# Patient Record
Sex: FEMALE | Race: WHITE | HISPANIC OR LATINO | Employment: STUDENT | URBAN - METROPOLITAN AREA
[De-identification: names, ages, dates, MRNs, and addresses within clinical notes are randomized per-mention and may not be internally consistent; named-entity substitution may affect disease eponyms.]

---
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
## 2022-03-09 ENCOUNTER — TELEMEDICINE (OUTPATIENT)
Dept: FAMILY MEDICINE CLINIC | Facility: CLINIC | Age: 22
End: 2022-03-09

## 2022-03-09 DIAGNOSIS — B34.9 VIRAL INFECTION: Primary | ICD-10-CM

## 2022-03-09 DIAGNOSIS — K13.79 MOUTH SORES: ICD-10-CM

## 2022-03-09 PROCEDURE — 99213 OFFICE O/P EST LOW 20 MIN: CPT | Performed by: FAMILY MEDICINE

## 2022-03-09 NOTE — PROGRESS NOTES
COVID-19 Outpatient Progress Note    Assessment/Plan:    Problem List Items Addressed This Visit        Other    Mouth sores     Developed after multiple episodes of vomiting, pain mostly on the roof of mouth  No difficulty swallowing  Will order magic mouthwash, advised to avoid acidic foods  Recommend in office visit to confirm no other cause           Relevant Medications    al mag oxide-diphenhydramine-lidocaine viscous (MAGIC MOUTHWASH) 1:1:1 suspension    Viral infection - Primary     Symptom onset 3/4  As she is 5 days from symptom onset and otherwise resolving, would recommend in person visit to evaluate mouth ulcers  Continue to advise supportive care as needed              Disposition:     I have spent 10 minutes directly with the patient  Encounter provider Yris Joyner MD    Provider located at [address] [place] [id number] [phone]    Recent Visits  No visits were found meeting these conditions  Showing recent visits within past 7 days and meeting all other requirements  Today's Visits  Date Type Provider Dept   03/09/22 Telemedicine Silas Marques 11 today's visits and meeting all other requirements  Future Appointments  No visits were found meeting these conditions  Showing future appointments within next 150 days and meeting all other requirements     This virtual check-in was done via telephone and she agrees to proceed  Patient agrees to participate in a virtual check in via telephone or video visit instead of presenting to the office to address urgent/immediate medical needs  Patient is aware this is a billable service  After connecting through Telephone, the patient was identified by name and date of birth  Emerick Cogan was informed that this was a telemedicine visit and that the exam was being conducted confidentially over secure lines  My office door was closed   No one else was in the room  El Record acknowledged consent and understanding of privacy and security of the telemedicine visit  I informed the patient that I have reviewed her record in Epic and presented the opportunity for her to ask any questions regarding the visit today  The patient agreed to participate  It was my intent to perform this visit via video technology but the patient was not able to do a video connection so the visit was completed via audio telephone only  Verification of patient location:  Patient is located in the following state in which I hold an active license: [place]    Subjective: El Record is a 25 y o  female who is concerned about COVID-19  Patient's symptoms include fatigue, sore throat, cough and vomiting  Patient denies fever, chills, shortness of breath, nausea and headaches  - Date of symptom onset: 3/4/2022      COVID-19 vaccination status: Partially vaccinated    No results found for: Chantel Rousseau, [address] [facility], [address] [facility], [place] [address], [address] [facility], [address] [facility]  Past Medical History:   Diagnosis Date    ADD (attention deficit disorder)     Anxiety 1/28/2020    Depression 1/28/2020    Gastroenteritis 12/26/2019     Past Surgical History:   Procedure Laterality Date    NO PAST SURGERIES       Current Outpatient Medications   Medication Sig Dispense Refill    al mag oxide-diphenhydramine-lidocaine viscous (MAGIC MOUTHWASH) 1:1:1 suspension Swish and spit 10 mL every 4 (four) hours as needed for mouth pain or discomfort 90 mL 0    Multiple Vitamins-Minerals (MULTIVITAMIN WITH MINERALS) tablet Take 1 tablet by mouth daily 90 tablet 3     No current facility-administered medications for this visit  No Known Allergies    Review of Systems   Constitutional: Positive for fatigue  Negative for chills and fever  HENT: Positive for sore throat  Respiratory: Positive for cough  Negative for shortness of breath  Gastrointestinal: Positive for vomiting   Negative for nausea  Neurological: Negative for headaches  VIRTUAL VISIT DISCLAIMER    Kristy Suarez verbally agrees to participate in [employer]  Pt is aware that [employer] could be limited without vital signs or the ability to perform a full hands-on physical [place] understands she or the provider may request at any time to terminate the video visit and request the patient to seek care or treatment in person

## 2022-03-09 NOTE — ASSESSMENT & PLAN NOTE
Developed after multiple episodes of vomiting, pain mostly on the roof of mouth  No difficulty swallowing  Will order magic mouthwash, advised to avoid acidic foods  Recommend in office visit to confirm no other cause

## 2022-03-09 NOTE — ASSESSMENT & PLAN NOTE
Symptom onset 3/4  As she is 5 days from symptom onset and otherwise resolving, would recommend in person visit to evaluate mouth ulcers  Continue to advise supportive care as needed

## 2022-10-12 PROBLEM — Z00.00 WELL ADULT HEALTH CHECK: Status: RESOLVED | Noted: 2021-04-27 | Resolved: 2022-10-12

## 2022-10-12 PROBLEM — Z11.8 SCREENING FOR CHLAMYDIAL DISEASE: Status: RESOLVED | Noted: 2021-04-27 | Resolved: 2022-10-12

## 2024-08-28 ENCOUNTER — TELEPHONE (OUTPATIENT)
Age: 24
End: 2024-08-28

## 2024-08-28 NOTE — TELEPHONE ENCOUNTER
Patients mom called with language line for [place] and I scheduled patient on 10/3.  I tried to call the office to see if more time needs to be added. Also I tried to enter the [employer] and our system is not going through . We been having issues today.  The id is [id number].  Thank you

## 2024-09-06 ENCOUNTER — HOSPITAL ENCOUNTER (EMERGENCY)
Facility: HOSPITAL | Age: 24
Discharge: HOME | End: 2024-09-07
Attending: EMERGENCY MEDICINE
Payer: COMMERCIAL

## 2024-09-06 VITALS
SYSTOLIC BLOOD PRESSURE: 173 MMHG | OXYGEN SATURATION: 97 % | DIASTOLIC BLOOD PRESSURE: 99 MMHG | RESPIRATION RATE: 18 BRPM | TEMPERATURE: 97.6 F | HEART RATE: 80 BPM

## 2024-09-06 DIAGNOSIS — R10.9 ABDOMINAL PAIN: Primary | ICD-10-CM

## 2024-09-06 DIAGNOSIS — N39.0 UTI (URINARY TRACT INFECTION): ICD-10-CM

## 2024-09-06 DIAGNOSIS — G43.909 MIGRAINE: ICD-10-CM

## 2024-09-06 DIAGNOSIS — R11.10 VOMITING: ICD-10-CM

## 2024-09-06 LAB
EXT PREGNANCY TEST URINE: NEGATIVE
EXT. CONTROL: NORMAL

## 2024-09-06 PROCEDURE — 81001 URINALYSIS AUTO W/SCOPE: CPT

## 2024-09-06 PROCEDURE — 99284 EMERGENCY DEPT VISIT MOD MDM: CPT | Performed by: EMERGENCY MEDICINE

## 2024-09-06 PROCEDURE — 96375 TX/PRO/DX INJ NEW DRUG ADDON: CPT

## 2024-09-06 PROCEDURE — 87591 N.GONORRHOEAE DNA AMP PROB: CPT

## 2024-09-06 PROCEDURE — 99284 EMERGENCY DEPT VISIT MOD MDM: CPT

## 2024-09-06 PROCEDURE — 96361 HYDRATE IV INFUSION ADD-ON: CPT

## 2024-09-06 PROCEDURE — 81025 URINE PREGNANCY TEST: CPT

## 2024-09-06 PROCEDURE — 96365 THER/PROPH/DIAG IV INF INIT: CPT

## 2024-09-06 PROCEDURE — 87491 CHLMYD TRACH DNA AMP PROBE: CPT

## 2024-09-06 RX ORDER — KETOROLAC TROMETHAMINE 30 MG/ML
15 INJECTION, SOLUTION INTRAMUSCULAR; INTRAVENOUS ONCE
Status: DISCONTINUED | OUTPATIENT
Start: 2024-09-06 | End: 2024-09-07 | Stop reason: HOSPADM

## 2024-09-06 RX ORDER — MAGNESIUM SULFATE HEPTAHYDRATE 40 MG/ML
2 INJECTION, SOLUTION INTRAVENOUS ONCE
Status: COMPLETED | OUTPATIENT
Start: 2024-09-06 | End: 2024-09-06

## 2024-09-06 RX ORDER — DIPHENHYDRAMINE HYDROCHLORIDE 50 MG/ML
25 INJECTION INTRAMUSCULAR; INTRAVENOUS ONCE
Status: COMPLETED | OUTPATIENT
Start: 2024-09-06 | End: 2024-09-06

## 2024-09-06 RX ORDER — METOCLOPRAMIDE HYDROCHLORIDE 5 MG/ML
10 INJECTION INTRAMUSCULAR; INTRAVENOUS ONCE
Status: COMPLETED | OUTPATIENT
Start: 2024-09-06 | End: 2024-09-06

## 2024-09-06 RX ADMIN — SODIUM CHLORIDE 1000 ML: 0.9 INJECTION, SOLUTION INTRAVENOUS at 21:14

## 2024-09-06 RX ADMIN — METOCLOPRAMIDE 10 MG: 5 INJECTION, SOLUTION INTRAMUSCULAR; INTRAVENOUS at 21:12

## 2024-09-06 RX ADMIN — MAGNESIUM SULFATE HEPTAHYDRATE 2 G: 40 INJECTION, SOLUTION INTRAVENOUS at 21:13

## 2024-09-06 RX ADMIN — DIPHENHYDRAMINE HYDROCHLORIDE 25 MG: 50 INJECTION, SOLUTION INTRAMUSCULAR; INTRAVENOUS at 21:13

## 2024-09-07 LAB
BACTERIA UR QL AUTO: ABNORMAL /HPF
BILIRUB UR QL STRIP: NEGATIVE
CLARITY UR: CLEAR
COLOR UR: YELLOW
GLUCOSE UR STRIP-MCNC: NEGATIVE MG/DL
HGB UR QL STRIP.AUTO: ABNORMAL
KETONES UR STRIP-MCNC: ABNORMAL MG/DL
LEUKOCYTE ESTERASE UR QL STRIP: NEGATIVE
MUCOUS THREADS UR QL AUTO: ABNORMAL
NITRITE UR QL STRIP: POSITIVE
NON-SQ EPI CELLS URNS QL MICRO: ABNORMAL /HPF
PH UR STRIP.AUTO: 6.5 [PH]
PROT UR STRIP-MCNC: ABNORMAL MG/DL
RBC #/AREA URNS AUTO: ABNORMAL /HPF
SP GR UR STRIP.AUTO: 1.02 (ref 1–1.03)
UROBILINOGEN UR STRIP-ACNC: <2 MG/DL
WBC #/AREA URNS AUTO: ABNORMAL /HPF

## 2024-09-07 RX ORDER — CEPHALEXIN 500 MG/1
500 CAPSULE ORAL ONCE
Status: COMPLETED | OUTPATIENT
Start: 2024-09-07 | End: 2024-09-07

## 2024-09-07 RX ORDER — CEPHALEXIN 500 MG/1
500 CAPSULE ORAL EVERY 6 HOURS SCHEDULED
Qty: 28 CAPSULE | Refills: 0 | Status: SHIPPED | OUTPATIENT
Start: 2024-09-07 | End: 2024-09-14

## 2024-09-07 RX ADMIN — CEPHALEXIN 500 MG: 500 CAPSULE ORAL at 00:29

## 2024-09-07 NOTE — ED PROVIDER NOTES
History  Chief Complaint   Patient presents with    Personal Problem     Pt reports getting her period today and states her pain is different the last couple months and reports SOB earlier but has anxiety and the SOB has since resolved but she's still cramping and in pain mostly on the left lower side.     HPI    Patient is a 23-year-old female  with no significant past medical history presenting for left lower quadrant abdominal pain, vomiting, and migraine that began earlier today after the start of her menstrual cycle.  Patient states that generally has pain with menstruation but not to this degree.  She has not been tolerating p.o. and has been vomiting all day today.  Patient states that her menstrual cycle is light and she is not saturating pads.  Patient also states she had an episode of diarrhea earlier today.  Patient denies fevers, chills, constipation, chest pain, shortness of breath, abnormal discharge, or urinary symptoms.    Prior to Admission Medications   Prescriptions Last Dose Informant Patient Reported? Taking?   Multiple Vitamins-Minerals (MULTIVITAMIN WITH MINERALS) tablet  Self No No   Sig: Take 1 tablet by mouth daily   al mag oxide-diphenhydramine-lidocaine viscous (MAGIC MOUTHWASH) 1:1:1 suspension   No No   Sig: Swish and spit 10 mL every 4 (four) hours as needed for mouth pain or discomfort      Facility-Administered Medications: None       Past Medical History:   Diagnosis Date    ADD (attention deficit disorder)     Anxiety 2020    Depression 2020    Gastroenteritis 2019       Past Surgical History:   Procedure Laterality Date    NO PAST SURGERIES         Family History   Problem Relation Age of Onset    No Known Problems Mother     Asthma Brother     Other Maternal Grandfather         Cardiac disorder    Coronary artery disease Maternal Grandfather     Diabetes Family     Diabetes Maternal Aunt      I have reviewed and agree with the history as  documented.    E-Cigarette/Vaping    E-Cigarette Use Never User      E-Cigarette/Vaping Substances    Nicotine No     THC No     CBD No     Flavoring No     Other No     Unknown No      Social History     Tobacco Use    Smoking status: Never    Smokeless tobacco: Never   Vaping Use    Vaping status: Never Used   Substance Use Topics    Alcohol use: Never    Drug use: Never        Review of Systems   Gastrointestinal:  Positive for abdominal pain, diarrhea and vomiting.   Neurological:  Positive for headaches.   All other systems reviewed and are negative.      Physical Exam  ED Triage Vitals [09/06/24 1912]   Temperature Pulse Respirations Blood Pressure SpO2   97.6 °F (36.4 °C) 86 16 (!) 173/99 97 %      Temp Source Heart Rate Source Patient Position - Orthostatic VS BP Location FiO2 (%)   Temporal Monitor Sitting Left arm --      Pain Score       6             Orthostatic Vital Signs  Vitals:    09/06/24 1912 09/06/24 2300   BP: (!) 173/99    Pulse: 86 80   Patient Position - Orthostatic VS: Sitting        Physical Exam  Vitals and nursing note reviewed.   Constitutional:       General: She is not in acute distress.     Appearance: Normal appearance. She is not ill-appearing, toxic-appearing or diaphoretic.   HENT:      Head: Normocephalic and atraumatic.   Eyes:      General: No scleral icterus.        Right eye: No discharge.         Left eye: No discharge.   Cardiovascular:      Rate and Rhythm: Normal rate and regular rhythm.      Pulses: Normal pulses.      Heart sounds: Normal heart sounds. No murmur heard.  Pulmonary:      Effort: Pulmonary effort is normal. No respiratory distress.      Breath sounds: Normal breath sounds. No stridor. No wheezing, rhonchi or rales.   Abdominal:      General: Bowel sounds are normal. There is no distension.      Palpations: Abdomen is soft.      Tenderness: There is generalized abdominal tenderness. There is no right CVA tenderness, left CVA tenderness or guarding. Negative  signs include Ordoñez's sign and McBurney's sign.   Musculoskeletal:         General: Normal range of motion.      Cervical back: Normal range of motion and neck supple. No tenderness.      Right lower leg: No edema.      Left lower leg: No edema.   Skin:     General: Skin is warm and dry.      Coloration: Skin is not jaundiced.      Findings: No erythema.   Neurological:      General: No focal deficit present.      Mental Status: She is alert and oriented to person, place, and time.      Sensory: No sensory deficit.      Motor: No weakness.   Psychiatric:         Mood and Affect: Mood normal.         Behavior: Behavior normal.         Thought Content: Thought content normal.         Judgment: Judgment normal.         ED Medications  Medications   metoclopramide (REGLAN) injection 10 mg (10 mg Intravenous Given 9/6/24 2112)   diphenhydrAMINE (BENADRYL) injection 25 mg (25 mg Intravenous Given 9/6/24 2113)   magnesium sulfate 2 g/50 mL IVPB (premix) 2 g (0 g Intravenous Stopped 9/6/24 2154)   sodium chloride 0.9 % bolus 1,000 mL (0 mL Intravenous Stopped 9/6/24 2236)   cephalexin (KEFLEX) capsule 500 mg (500 mg Oral Given 9/7/24 0029)       Diagnostic Studies  Results Reviewed       Procedure Component Value Units Date/Time    Urine Microscopic [[id number]]  (Abnormal) Collected: 09/06/24 2227    Lab Status: Final result Specimen: Urine, Clean Catch Updated: 09/07/24 0021     RBC, UA Innumerable /hpf      WBC, UA 2-4 /hpf      Epithelial Cells Occasional /hpf      Bacteria, UA None Seen /hpf      MUCUS THREADS Occasional    UA w Reflex to Microscopic w Reflex to Culture [[id number]]  (Abnormal) Collected: 09/06/24 2227    Lab Status: Final result Specimen: Urine, Clean Catch Updated: 09/07/24 0020     Color, UA Yellow     Clarity, UA Clear     Specific Gravity, UA 1.025     pH, UA 6.5     Leukocytes, UA Negative     Nitrite, UA Positive     Protein, UA 30 (1+) mg/dl      Glucose, UA Negative mg/dl      Ketones, UA >=150  (4+) mg/dl      Urobilinogen, UA <2.0 mg/dl      Bilirubin, UA Negative     Occult Blood, UA Moderate    Chlamydia/GC amplified DNA by PCR [[id number]] Collected: 09/06/24 2227    Lab Status: In process Specimen: Urine, Other Updated: 09/06/24 2237    POCT pregnancy, urine [[id number]]  (Normal) Resulted: 09/06/24 2232    Lab Status: Final result Updated: 09/06/24 2232     EXT Preg Test, Ur Negative     Control Valid                   No orders to display         Procedures  Procedures      ED Course  ED Course as of 09/07/24 1327   Fri Sep 06, 2024   2202 Patient reassessed and states she is feeling better.  Patient provided with a urine cup to provide sample.   2232 PREGNANCY TEST URINE: Negative                             SBIRT 22yo+      Flowsheet Row Most Recent Value   Initial Alcohol Screen: US AUDIT-C     1. How often do you have a drink containing alcohol? 0 Filed at: 09/06/2024 1914   2. How many drinks containing alcohol do you have on a typical day you are drinking?  0 Filed at: 09/06/2024 1914   3b. FEMALE Any Age, or MALE 65+: How often do you have 4 or more drinks on one occassion? 0 Filed at: 09/06/2024 1914   Audit-C Score 0 Filed at: 09/06/2024 1914   JOSE: How many times in the past year have you...    Used an illegal drug or used a prescription medication for non-medical reasons? Never Filed at: 09/06/2024 1914                  Medical Decision Making  Amount and/or Complexity of Data Reviewed  Labs: ordered. Decision-making details documented in ED Course.    Risk  Prescription drug management.      Patient is a 23 y.o. female with no significant past medical history who presents to the ED with left lower quadrant abdominal pain, vomiting, and migraine.    Vital signs hypertensive but otherwise stable. On exam generalized abdominal tenderness.    History and physical exam most consistent with viral gastroenteritis and migraine. However, differential diagnosis included but not limited to urinary  "tract infection, sexually transmitted infection, pelvic inflammatory disease, pregnancy.     Plan: Migraine cocktail consisting of magnesium, IV fluids, Toradol, Benadryl, and Reglan.  Patient did not receive Toradol.  Urinalysis, point-of-care pregnancy, gonorrhea/chlamydia.    View ED course above for further discussion on patient workup.     On review of previous records patient has no relevant past medical history related to this visit.    All labs reviewed and utilized in the medical decision making process  I reviewed all testing with the patient.     Upon re-evaluation patient's symptoms have improved as described in the ED course above.    Disposition: Case signed out to Dr. Leah Contreras DO, pending urinalysis.    Portions of the record may have been created with voice recognition software. Occasional wrong word or \"sound a like\" substitutions may have occurred due to the inherent limitations of voice recognition software. Read the chart carefully and recognize, using context, where substitutions have occurred.      Disposition  Final diagnoses:   Abdominal pain   Vomiting   Migraine   UTI (urinary tract infection)     Time reflects when diagnosis was documented in both MDM as applicable and the Disposition within this note       Time User Action Codes Description Comment    9/6/2024 10:47 PM Shamar Márquez [R10.9] Abdominal pain     9/6/2024 10:47 PM Shamar Márquez [R11.10] Vomiting     9/6/2024 10:47 PM Shamar Márquez [G43.909] Migraine     9/7/2024 12:26 AM Leah Contreras [N39.0] UTI (urinary tract infection)           ED Disposition       ED Disposition   Discharge    Condition   Stable    Date/Time   Sat Sep 7, 2024 0026    Comment   Riana Borden discharge to home/self care.                   Follow-up Information       Follow up With Specialties Details Why Contact Info Additional Information    Gabrielle Cruz MD Family Medicine Call in 3 days If symptoms worsen [address] " [place] [id number]  [phone]       [facility] Emergency Department Emergency Medicine Go to  If concerning symptoms present [address] [facility] [address] [phone] [facility] Emergency Department, [address], [place], [id number] [phone]            Discharge Medication List as of 9/7/2024 12:26 AM        START taking these medications    Details   cephalexin (KEFLEX) 500 mg capsule Take 1 capsule (500 mg total) by mouth every 6 (six) hours for 7 days, Starting Sat 9/7/2024, Until Sat 9/14/2024, Print           CONTINUE these medications which have NOT CHANGED    Details   al mag oxide-diphenhydramine-lidocaine viscous (MAGIC MOUTHWASH) 1:1:1 suspension Swish and spit 10 mL every 4 (four) hours as needed for mouth pain or discomfort, Starting Wed 3/9/2022, Normal      Multiple Vitamins-Minerals (MULTIVITAMIN WITH MINERALS) tablet Take 1 tablet by mouth daily, Starting Tue 1/28/2020, Normal           No discharge procedures on file.    PDMP Review       None             ED Provider  Attending physically available and evaluated Riana Borden. I managed the patient along with the ED Attending.    Electronically Signed by           Shamar Márquez DO  09/07/24 5111

## 2024-09-07 NOTE — DISCHARGE INSTRUCTIONS
You were seen in the emergency department today for evaluation of migraine, abdominal pain, and vomiting.  Based on your testing and clinical story, we believe that you are suffering from a viral gastroenteritis.  Please continue to hydrate as best as you can and eat as tolerated.  Please return to the ED if you experience severe abdominal pain, chest pain, shortness of breath, intractable vomiting, weakness, numbness, tingling, or if you pass out.    Urine shows evidence of infection. A prescription was sent to your pharmacy for an antibiotic. Please take this as directed.

## 2024-09-07 NOTE — ED ATTENDING ATTESTATION
9/6/2024  I, Viviana Mcleod MD, saw and evaluated the patient. I have discussed the patient with the resident/non-physician practitioner and agree with the resident's/non-physician practitioner's findings, Plan of Care, and MDM as documented in the resident's/non-physician practitioner's note, except where noted. All available labs and Radiology studies were reviewed.  I was present for key portions of any procedure(s) performed by the resident/non-physician practitioner and I was immediately available to provide assistance.       At this point I agree with the current assessment done in the Emergency Department.  I have conducted an independent evaluation of this patient a history and physical is as follows:    ED Course         Critical Care Time  Procedures    24 yo female no pmh, having generalized abdominal pain and vomiting and period started this morning as well.  Pt with migraine headache, decreased po intake.  Pt with no fever, no chills, no sob.  No vaginal discharge, no urinary complaints.  Pt requesting std testing.  Vss, afebrile, lungs cta, rrr, abdomen soft diffusely tender, no peripheral edema.  Urine, urine preg, ivf, reglan, toradol, urine gc and chlamydia.

## 2024-09-08 LAB
C TRACH DNA SPEC QL NAA+PROBE: NEGATIVE
N GONORRHOEA DNA SPEC QL NAA+PROBE: NEGATIVE

## 2024-10-03 ENCOUNTER — OFFICE VISIT (OUTPATIENT)
Dept: OBGYN CLINIC | Facility: CLINIC | Age: 24
End: 2024-10-03
Payer: COMMERCIAL

## 2024-10-03 VITALS
WEIGHT: 221 LBS | DIASTOLIC BLOOD PRESSURE: 84 MMHG | BODY MASS INDEX: 35.52 KG/M2 | HEIGHT: 66 IN | SYSTOLIC BLOOD PRESSURE: 132 MMHG

## 2024-10-03 DIAGNOSIS — Z30.09 ENCOUNTER FOR COUNSELING REGARDING CONTRACEPTION: ICD-10-CM

## 2024-10-03 DIAGNOSIS — Z72.51 UNPROTECTED SEXUAL INTERCOURSE: Primary | ICD-10-CM

## 2024-10-03 DIAGNOSIS — Z32.01 POSITIVE PREGNANCY TEST: ICD-10-CM

## 2024-10-03 DIAGNOSIS — Z3A.01 LESS THAN 8 WEEKS GESTATION OF PREGNANCY: ICD-10-CM

## 2024-10-03 DIAGNOSIS — Z01.419 WELL WOMAN EXAM WITH ROUTINE GYNECOLOGICAL EXAM: ICD-10-CM

## 2024-10-03 DIAGNOSIS — N94.6 PAINFUL MENSTRUAL PERIODS: ICD-10-CM

## 2024-10-03 LAB — SL AMB POCT URINE HCG: POSITIVE

## 2024-10-03 PROCEDURE — G0145 SCR C/V CYTO,THINLAYER,RESCR: HCPCS | Performed by: STUDENT IN AN ORGANIZED HEALTH CARE EDUCATION/TRAINING PROGRAM

## 2024-10-03 PROCEDURE — 81025 URINE PREGNANCY TEST: CPT | Performed by: STUDENT IN AN ORGANIZED HEALTH CARE EDUCATION/TRAINING PROGRAM

## 2024-10-03 PROCEDURE — S0610 ANNUAL GYNECOLOGICAL EXAMINA: HCPCS | Performed by: STUDENT IN AN ORGANIZED HEALTH CARE EDUCATION/TRAINING PROGRAM

## 2024-10-03 NOTE — PROGRESS NOTES
"Annual GYN Exam      Subjective:   Riana Borden 24 y.o.   who presents for annual exam.     Patient's last menstrual period was 2024 (exact date).   Menstrual History  Currently get periods:: yes, regular, painful periods     Sexual History  Sexually active with 1 male partner   Contraception - none. Interested in starting.        Pertinent Medical History   Previous PAP Tests  Date of Last Pap if Known: has not completed screening     OB History    Para Term  AB Living   0 0 0 0 0 0   SAB IAB Ectopic Multiple Live Births   0 0 0 0 0     Family history reviewed for genetic cancer assessment.  Maternal aunt with  breast cancer < age of 50   Patient will speak to mom about genetic testing     Social History   Denies tobacco use  Social alcohol use   Smokes weed 3x weekly   Health Maintenance and Screenings   PHQ2    Denies feeling depressed and thoughts of self harm.   Safe at home? Feels safe, lives with mom, brother and sister     Pap: collected    Hepatitis C: ordered   Gardasil ordered  Flu ordered     Objective:   /84 (BP Location: Left arm, Patient Position: Sitting, Cuff Size: Standard)   Ht 5' 6\" (1.676 m)   Wt 100 kg (221 lb)   LMP 2024 (Exact Date)   BMI 35.67 kg/m²   Physical Exam  Constitutional:       General: She is not in acute distress.     Appearance: Normal appearance. She is well-developed. She is not ill-appearing.   Genitourinary:      Vulva normal.        Right Adnexa: not tender, not full and no mass present.     Left Adnexa: not tender, not full and no mass present.     No cervical friability or lesion.      Uterus is not enlarged or tender.   Breasts:     Breasts are symmetrical.      Breasts are soft.     Right: Normal. No inverted nipple, mass, nipple discharge, skin change or tenderness.      Left: Normal. No inverted nipple, mass, nipple discharge, skin change or tenderness.   HENT:      Head: Normocephalic and atraumatic.   Neck:      Thyroid: " No thyromegaly or thyroid tenderness.   Cardiovascular:      Rate and Rhythm: Normal rate.   Pulmonary:      Effort: Pulmonary effort is normal. No respiratory distress.   Abdominal:      General: There is no distension.      Palpations: Abdomen is soft. There is no mass.      Tenderness: There is no abdominal tenderness. There is no guarding or rebound.   Musculoskeletal:      Cervical back: Normal range of motion and neck supple.   Lymphadenopathy:      Cervical: No cervical adenopathy.      Upper Body:      Right upper body: No supraclavicular or axillary adenopathy.      Left upper body: No supraclavicular or axillary adenopathy.   Neurological:      Mental Status: She is alert.   Psychiatric:         Attention and Perception: Attention normal.         Mood and Affect: Mood normal.   Vitals reviewed.             Assessment/Plan:   Annual Exam  Cervical cancer screening  Pap smear is indicated at this time.  Previous pap smears and pap smear guidelines have been reviewed.    STI screening  STI screening ordered.     Breast exam and breast cancer screening  Breast exam was done.    Contraception  TRO for Kyleena IUD placement     Health Maintenance  PHQ-2 depression screen completed.  Reviewed tobacco and alcohol use    Body mass index is 35.67 kg/m².    Problem List Items Addressed This Visit    None  Visit Diagnoses       Unprotected sexual intercourse    -  Primary    Relevant Orders    HIV 1/2 AG/AB w Reflex [facility] for 2 yr old and above    Hepatitis B surface antigen    Hepatitis C antibody    RPR-Syphilis Screening (Total Syphilis IGG/IGM)    POCT urine HCG (Completed)    Well woman exam with routine gynecological exam        Relevant Orders    HPV Vaccine 9 valent IM    POCT urine HCG (Completed)    Encounter for counseling regarding contraception        Relevant Orders    POCT urine HCG (Completed)    Painful menstrual periods        Relevant Orders    POCT urine HCG (Completed)    Positive pregnancy test         Relevant Orders    POCT urine HCG (Completed)    Less than 8 weeks gestation of pregnancy              Of note- preg test was performed at today's visit as patient was interested in receiving Gardasil vaccine. Test resulted positive. Patient is 3w6d by LMP.   Pregnancy unplanned- patient feels she has good support at home with family and partner. Had been planning to move to [place] with partner.  Early pregnancy precautions given. Recommend D+V scan

## 2024-10-09 LAB
LAB AP GYN PRIMARY INTERPRETATION: NORMAL
Lab: NORMAL

## 2024-11-14 ENCOUNTER — ULTRASOUND (OUTPATIENT)
Dept: OBGYN CLINIC | Facility: CLINIC | Age: 24
End: 2024-11-14
Payer: COMMERCIAL

## 2024-11-14 VITALS
DIASTOLIC BLOOD PRESSURE: 82 MMHG | SYSTOLIC BLOOD PRESSURE: 120 MMHG | HEIGHT: 66 IN | WEIGHT: 219 LBS | BODY MASS INDEX: 35.2 KG/M2

## 2024-11-14 DIAGNOSIS — Z3A.09 9 WEEKS GESTATION OF PREGNANCY: ICD-10-CM

## 2024-11-14 DIAGNOSIS — R51.9 GENERALIZED HEADACHES: Primary | ICD-10-CM

## 2024-11-14 PROCEDURE — 76817 TRANSVAGINAL US OBSTETRIC: CPT | Performed by: STUDENT IN AN ORGANIZED HEALTH CARE EDUCATION/TRAINING PROGRAM

## 2024-11-14 PROCEDURE — 99214 OFFICE O/P EST MOD 30 MIN: CPT | Performed by: STUDENT IN AN ORGANIZED HEALTH CARE EDUCATION/TRAINING PROGRAM

## 2024-11-14 RX ORDER — RIBOFLAVIN (VITAMIN B2) 400 MG
400 TABLET ORAL DAILY
Qty: 30 TABLET | Refills: 2 | Status: SHIPPED | OUTPATIENT
Start: 2024-11-14 | End: 2025-02-12

## 2024-11-14 RX ORDER — CALCIUM CARBONATE/VITAMIN D3 500-10/5ML
400 LIQUID (ML) ORAL DAILY
Qty: 30 TABLET | Refills: 2 | Status: SHIPPED | OUTPATIENT
Start: 2024-11-14 | End: 2025-02-12

## 2024-11-14 NOTE — LETTER
November 14, 2024     Riana Borden    Patient: Riana Borden   YOB: 2000   Date of Visit: 11/14/2024       Dear Employer:      Riana Borden is currently under my care for pregnancy. The following temporary restrictions are applicable through the entire pregnancy period:    1) Before 20 weeks pregnant:    a. Repetitive long duration lifting:  i. Lifting tasks more than once every 5 minutes for more than 1 hour a day  ii. Reach at waist height:    1. 15 inches from body: 18lbs maximum  2. 20 inches from body: 12lbs maximum  3. 25 inches from body: 10lbs maximum    b. Repetitive short duration lifting:  i. Liftings tasks more than once every 5 minutes for less than 1 hour a day    1. 15 inches from body: 30lbs maximum  2. 20 inches from body: 22lbs maximum  3. 25 inches from body: 17lbs maximum    c. Infrequent lifting  i. Lifting tasks less than once every 5 minutes  ii. Reach at waist height:    1. 15 inches from body: 36lbs maximum  2. 20 inches from body: 26lbs maximum  3. 25 inches from body: 20lbs maximum    2) After 20 weeks pregnant:    a. Repetitive long duration lifting:  i. Lifting tasks more than once every 5 minutes for more than 1 hour a day  ii. Reach at waist height:    1. 20 inches from body: 13lbs maximum  2. 25 inches from body: 10lbs maximum    b. Repetitive short duration lifting:  i. Liftings tasks more than once every 5 minutes for less than 1 hour a day    1. 20 inches from body: 22lbs maximum  2. 25 inches from body: 17lbs maximum    c. Infrequent lifting  i. Lifting tasks less than once every 5 minutes  ii. Reach at waist height:    1. 20 inches from body: 26lbs maximum  2. 25 inches from body: 20lbs maximum    3) Chemical exposure:  a. We recommend appropriate ventilation, personal protective equipment, meeting standards per OSHA guidelines.  b. If there is specific chemical that the employee works with and that they are concerned about, please address that specific  concern with your employer.  4) Considerations for heavy lifting, excessive repetition, awkward postures and prolonged periods of sitting or standing:  a. As needed utilization of floor mats, sit-stand workstations, compression stockings and supportive shoes  b. A 15-minute break for every 2 hours of prolonged sitting or standing    c. Restriction of ladders, but patient should be able to climb stairs and utilize stepstools    These restrictions are based on the American College of Obst[employer] Gyne[employer] (ACOG) - Committee Opinion on “Employment Considerations During Pregnancy” April 2018, we can't guarantee employment. We suggest work restriction or modification when medically necessary. Current federal and state laws provide protection for some pregnancy women, but not others. There are [id number] differences - for instance, in [place], short-term disability is not approved for pregnancy alone.      Sincerely,        Abbie Davila MD

## 2024-11-14 NOTE — PROGRESS NOTES
S: 24 y.o.   who presents for viability scan with LMP of 2024- reports certain and regular LMP. She is 9 weeks and 6 days by her LMP. She reports nausea and headaches. She has had mild cramping and mild vaginal spotting early in pregnancy that has resolved. This is not a planned but is a welcomed pregnancy.     She is taking her prenatal vitamin.    Past Medical History:   Diagnosis Date    ADD (attention deficit disorder)     Anxiety 2020    Depression 2020    Gastroenteritis 2019       OB History    Para Term  AB Living   1 0 0 0 0 0   SAB IAB Ectopic Multiple Live Births   0 0 0 0 0      # Outcome Date GA Lbr Ang/2nd Weight Sex Type Anes PTL Lv   1 Current                 O:  Vitals:    24 1143   BP: 120/82       TVUS: viable, dolan IUP at 9 weeks 6 days with CRL 2.68. FHT 170s. PATRICIO 24. Final dating via LMP, consistent with first tri US.      A/P:  #1. IUP at 9 weeks and 6 days  - Viable pregnancy on TVUS  - RTC in 1-2 weeks for nurse intake visit  - MFM referral placed for cfDNA and NT      Problem List       Nausea & vomiting    Depression    Anxiety    Screening for HIV (human immunodeficiency virus)    Mouth sores    Viral infection          Abbie Davila MD    OB/GYN  2024  11:54 AM

## 2024-11-15 ENCOUNTER — TELEPHONE (OUTPATIENT)
Age: 24
End: 2024-11-15

## 2024-11-19 ENCOUNTER — APPOINTMENT (OUTPATIENT)
Dept: LAB | Facility: CLINIC | Age: 24
End: 2024-11-19
Payer: COMMERCIAL

## 2024-11-19 DIAGNOSIS — Z3A.09 9 WEEKS GESTATION OF PREGNANCY: ICD-10-CM

## 2024-11-19 DIAGNOSIS — Z72.51 UNPROTECTED SEXUAL INTERCOURSE: ICD-10-CM

## 2024-11-19 LAB
ABO GROUP BLD: NORMAL
BLD GP AB SCN SERPL QL: NEGATIVE
HIV 1+2 AB+HIV1 P24 AG SERPL QL IA: NORMAL
HIV 2 AB SERPL QL IA: NORMAL
HIV1 AB SERPL QL IA: NORMAL
HIV1 P24 AG SERPL QL IA: NORMAL
RH BLD: POSITIVE
SPECIMEN EXPIRATION DATE: NORMAL
TREPONEMA PALLIDUM IGG+IGM AB [PRESENCE] IN SERUM OR PLASMA BY IMMUNOASSAY: NORMAL

## 2024-11-19 PROCEDURE — 86900 BLOOD TYPING SEROLOGIC ABO: CPT

## 2024-11-19 PROCEDURE — 87340 HEPATITIS B SURFACE AG IA: CPT

## 2024-11-19 PROCEDURE — 86803 HEPATITIS C AB TEST: CPT

## 2024-11-19 PROCEDURE — 86850 RBC ANTIBODY SCREEN: CPT

## 2024-11-19 PROCEDURE — 36415 COLL VENOUS BLD VENIPUNCTURE: CPT

## 2024-11-19 PROCEDURE — 87389 HIV-1 AG W/HIV-1&-2 AB AG IA: CPT

## 2024-11-19 PROCEDURE — 86780 TREPONEMA PALLIDUM: CPT

## 2024-11-19 PROCEDURE — 86901 BLOOD TYPING SEROLOGIC RH(D): CPT

## 2024-11-20 LAB
HBV SURFACE AG SER QL: NORMAL
HCV AB SER QL: NORMAL